# Patient Record
Sex: FEMALE | Race: WHITE | Employment: STUDENT | URBAN - METROPOLITAN AREA
[De-identification: names, ages, dates, MRNs, and addresses within clinical notes are randomized per-mention and may not be internally consistent; named-entity substitution may affect disease eponyms.]

---
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
## 2023-07-19 NOTE — PROGRESS NOTES
Patient seen and examined. Reports back pain. No hunger at this time. Visit Vitals    /63 (BP 1 Location: Right arm, BP Patient Position: At rest)    Pulse 107    Temp 98.4 °F (36.9 °C)    Resp 19    Ht 154.9 cm    Wt 45.7 kg    SpO2 100%    BMI 19.05 kg/m2     PE - BLE - nvi   - motor in tact   - BCR x 5      - abd - soft and compressible    . Recent Results (from the past 12 hour(s))   METABOLIC PANEL, COMPREHENSIVE    Collection Time: 05/24/18  3:27 AM   Result Value Ref Range    Sodium 143 (H) 132 - 141 mmol/L    Potassium 4.0 3.5 - 5.1 mmol/L    Chloride 109 (H) 97 - 108 mmol/L    CO2 25 18 - 29 mmol/L    Anion gap 9 5 - 15 mmol/L    Glucose 114 54 - 117 mg/dL    BUN 15 6 - 20 MG/DL    Creatinine 0.55 0.30 - 1.10 MG/DL    BUN/Creatinine ratio 27 (H) 12 - 20      GFR est AA Cannot be calculated >60 ml/min/1.73m2    GFR est non-AA Cannot be calculated >60 ml/min/1.73m2    Calcium 8.3 (L) 8.5 - 10.1 MG/DL    Bilirubin, total 2.0 (H) 0.2 - 1.0 MG/DL    ALT (SGPT) 44 12 - 78 U/L    AST (SGOT) 66 (H) 10 - 30 U/L    Alk.  phosphatase 109 80 - 210 U/L    Protein, total 6.0 6.0 - 8.0 g/dL    Albumin 3.7 3.2 - 5.5 g/dL    Globulin 2.3 2.0 - 4.0 g/dL    A-G Ratio 1.6 1.1 - 2.2     CBC WITH MANUAL DIFF    Collection Time: 05/24/18  3:27 AM   Result Value Ref Range    WBC 8.5 4.2 - 9.4 K/uL    RBC 3.09 (L) 3.93 - 4.90 M/uL    HGB 9.2 (L) 10.8 - 13.3 g/dL    HCT 27.6 (L) 33.4 - 40.4 %    MCV 89.3 76.9 - 90.6 FL    MCH 29.8 24.8 - 30.2 PG    MCHC 33.3 31.5 - 34.2 g/dL    RDW 12.4 12.3 - 14.6 %    PLATELET 009 (L) 941 - 345 K/uL    MPV 11.7 9.6 - 11.7 FL    NRBC 0.0 0  WBC    ABSOLUTE NRBC 0.00 (L) 0.03 - 0.13 K/uL    NEUTROPHILS 62 39 - 74 %    BAND NEUTROPHILS 5 0 - 6 %    LYMPHOCYTES 26 18 - 50 %    MONOCYTES 7 4 - 11 %    EOSINOPHILS 0 0 - 3 %    BASOPHILS 0 0 - 1 %    METAMYELOCYTES 0 0 %    MYELOCYTES 0 0 %    PROMYELOCYTES 0 0 %    BLASTS 0 0 %    OTHER CELL 0 0      IMMATURE GRANULOCYTES 0 % ABS. NEUTROPHILS 5.7 1.8 - 7.5 K/UL    ABS. LYMPHOCYTES 2.2 1.2 - 3.3 K/UL    ABS. MONOCYTES 0.6 0.2 - 0.7 K/UL    ABS. EOSINOPHILS 0.0 0.0 - 0.3 K/UL    ABS. BASOPHILS 0.0 0.0 - 0.1 K/UL    ABS. IMM.  GRANS. 0.0 K/UL    DF MANUAL      RBC COMMENTS NORMOCYTIC, NORMOCHROMIC       Assessment - doing well post-op    Plan - Advance along spine pathway   Will see in PM [Acting Fussy] : not acting ~L fussy [Wgt Loss (___ Lbs)] : no recent weight loss [Fever] : no fever [Failure To Thrive] : no failure to thrive [Eye Discharge] : no eye discharge [Redness] : no redness [Swollen Eyelids] : no swollen eyelids [Nasal Discharge] : no nasal discharge [Nasal Stuffiness] : no nasal congestion [Sore Throat] : no sore throat [Nosebleeds] : no epistaxis [Earache] : no earache [Cyanosis] : no cyanosis [Edema] : no edema [Diaphoresis] : not diaphoretic [Exercise Intolerance] : no persistence of exercise intolerance [Tachypnea] : not tachypneic [Wheezing] : no wheezing [Cough] : no cough [Abdominal Pain] : no abdominal pain [Vomiting] : no vomiting [Diarrhea] : no diarrhea [Decrease In Appetite] : appetite not decreased [Constipation] : no constipation [Seizure] : no seizures [Hypotonicity (Flaccid)] : not hypotonic [Hypertonicity] : not hypertonic [Limping] : no limping [Joint Pains] : no arthralgias [Joint Swelling] : no joint swelling [Rash] : no rash [Leg Pain] : no leg pain [Insect Bites] : no insect bites [Bruising] : no tendency for easy bruising [Swollen Glands] : no lymphadenopathy [Sleep Disturbances] : ~T no sleep disturbances [Hyperactive] : no hyperactive behavior [Tantrums] : no tantrums [Dec Urine Output] : no oliguria [Urinary Frequency] : no change in urinary frequency [Pain During Urination] : no dysuria [Testicular Swelling] : no swelling

## (undated) DEVICE — STERILE POLYISOPRENE POWDER-FREE SURGICAL GLOVES WITH EMOLLIENT COATING: Brand: PROTEXIS

## (undated) DEVICE — DRAPE,EENT,SPLIT,STERILE: Brand: MEDLINE

## (undated) DEVICE — Z DISCONTINUED USE 2744636  DRESSING AQUACEL 14 IN ALG W3.5XL14IN POLYUR FLM CVR W/ HYDRCOLL

## (undated) DEVICE — SOLUTION IRRIG 3000ML 0.9% SOD CHL FLX CONT 0797208] ICU MEDICAL INC]

## (undated) DEVICE — 4-PORT MANIFOLD: Brand: NEPTUNE 2

## (undated) DEVICE — BONE WAX WHITE: Brand: BONE WAX WHITE

## (undated) DEVICE — Z DISCONTINUED USE 2716304 SUTURE STRATAFIX SPRL SZ 3-0 L12IN ABSRB UD FS-1 L24MM 3/8

## (undated) DEVICE — ADHESIVE SKIN CLOSURE 4X44 CM PREMIERPRO EXOFINFUSION DISP

## (undated) DEVICE — SUTURE STRATAFIX SPRL SZ 1 L14IN ABSRB VLT L48CM CTX 1/2 SXPD2B405

## (undated) DEVICE — REM POLYHESIVE ADULT PATIENT RETURN ELECTRODE: Brand: VALLEYLAB

## (undated) DEVICE — PILLOW POS AD L7IN R FOAM HD REST INTUB SLOT DISP

## (undated) DEVICE — HANDPIECE SET WITH BONE CLEANING TIP AND SUCTION TUBE: Brand: INTERPULSE

## (undated) DEVICE — GAUZE SPONGES,12 PLY: Brand: CURITY

## (undated) DEVICE — LAMINECTOMY RICHMOND-LF: Brand: MEDLINE INDUSTRIES, INC.

## (undated) DEVICE — 5.0MM ZYPHR ROUND FLUTED: Brand: ZYPHR

## (undated) DEVICE — INFECTION CONTROL KIT SYS

## (undated) DEVICE — PREP SKN PREVAIL 40ML APPL --

## (undated) DEVICE — SOLUTION IV 1000ML 0.9% SOD CHL

## (undated) DEVICE — TRAY CATH OD16FR SIL URIN M STATLOK STBL DEV SURSTP

## (undated) DEVICE — COVER,TABLE,HEAVY DUTY,60"X90",STRL: Brand: MEDLINE

## (undated) DEVICE — SUTURE STRATAFIX SPRL SZ 2-0 L14IN ABSRB VLT MH L36MM 1/2 SXPD2B412

## (undated) DEVICE — ROCKER SWITCH PENCIL BLADE ELECTRODE, HOLSTER: Brand: EDGE

## (undated) DEVICE — 6.5MM X 35MM CORTICAL BONE SCREW
Type: IMPLANTABLE DEVICE | Site: SPINE LUMBAR | Status: NON-FUNCTIONAL
Brand: JANUS
Removed: 2018-05-23

## (undated) DEVICE — DRAPE XR C ARM 41X74IN LF --

## (undated) DEVICE — 1010 S-DRAPE TOWEL DRAPE 10/BX: Brand: STERI-DRAPE™